# Patient Record
Sex: MALE | NOT HISPANIC OR LATINO
[De-identification: names, ages, dates, MRNs, and addresses within clinical notes are randomized per-mention and may not be internally consistent; named-entity substitution may affect disease eponyms.]

---

## 2017-05-15 ENCOUNTER — RECORDS - HEALTHEAST (OUTPATIENT)
Dept: ADMINISTRATIVE | Facility: OTHER | Age: 22
End: 2017-05-15

## 2017-06-02 ENCOUNTER — HOSPITAL ENCOUNTER (OUTPATIENT)
Dept: CARDIOLOGY | Facility: CLINIC | Age: 22
Discharge: HOME | End: 2017-06-02

## 2017-06-02 DIAGNOSIS — I47.10 SVT (SUPRAVENTRICULAR TACHYCARDIA) (H): ICD-10-CM

## 2022-07-12 ENCOUNTER — OFFICE VISIT (OUTPATIENT)
Dept: FAMILY MEDICINE | Facility: CLINIC | Age: 27
End: 2022-07-12

## 2022-07-12 VITALS
HEART RATE: 72 BPM | BODY MASS INDEX: 27.28 KG/M2 | DIASTOLIC BLOOD PRESSURE: 83 MMHG | OXYGEN SATURATION: 97 % | SYSTOLIC BLOOD PRESSURE: 129 MMHG | TEMPERATURE: 98.1 F | HEIGHT: 77 IN | WEIGHT: 231 LBS

## 2022-07-12 DIAGNOSIS — Z91.89 HISTORY OF WEIGHT CHANGE: ICD-10-CM

## 2022-07-12 DIAGNOSIS — R61 NIGHT SWEATS: ICD-10-CM

## 2022-07-12 DIAGNOSIS — J45.30 MILD PERSISTENT ASTHMA WITHOUT COMPLICATION: ICD-10-CM

## 2022-07-12 DIAGNOSIS — Z87.898 HISTORY OF SUBSTANCE USE: ICD-10-CM

## 2022-07-12 DIAGNOSIS — Z11.3 ROUTINE SCREENING FOR STI (SEXUALLY TRANSMITTED INFECTION): ICD-10-CM

## 2022-07-12 DIAGNOSIS — R00.2 PALPITATIONS: ICD-10-CM

## 2022-07-12 DIAGNOSIS — R82.90 CLOUDY URINE: ICD-10-CM

## 2022-07-12 DIAGNOSIS — Z00.00 HEALTH MAINTENANCE EXAMINATION: Primary | ICD-10-CM

## 2022-07-12 DIAGNOSIS — Z11.1 SCREENING EXAMINATION FOR PULMONARY TUBERCULOSIS: ICD-10-CM

## 2022-07-12 LAB
ALBUMIN UR-MCNC: NEGATIVE MG/DL
APPEARANCE UR: CLEAR
BILIRUB UR QL STRIP: NEGATIVE
COLOR UR AUTO: YELLOW
GLUCOSE UR STRIP-MCNC: NEGATIVE MG/DL
HGB UR QL STRIP: NEGATIVE
KETONES UR STRIP-MCNC: NEGATIVE MG/DL
LEUKOCYTE ESTERASE UR QL STRIP: NEGATIVE
NITRATE UR QL: NEGATIVE
PH UR STRIP: 5.5 [PH] (ref 5–7)
SP GR UR STRIP: >=1.03 (ref 1–1.03)
UROBILINOGEN UR STRIP-ACNC: 0.2 E.U./DL

## 2022-07-12 PROCEDURE — 87591 N.GONORRHOEAE DNA AMP PROB: CPT | Performed by: NURSE PRACTITIONER

## 2022-07-12 PROCEDURE — 87491 CHLMYD TRACH DNA AMP PROBE: CPT | Performed by: NURSE PRACTITIONER

## 2022-07-12 RX ORDER — FLUTICASONE PROPIONATE 44 UG/1
2 AEROSOL, METERED RESPIRATORY (INHALATION) 2 TIMES DAILY
Qty: 10.6 G | Refills: 2 | Status: SHIPPED | OUTPATIENT
Start: 2022-07-12

## 2022-07-12 RX ORDER — ALBUTEROL SULFATE 90 UG/1
2 AEROSOL, METERED RESPIRATORY (INHALATION) EVERY 6 HOURS PRN
Qty: 9.9 G | Refills: 2 | Status: SHIPPED | OUTPATIENT
Start: 2022-07-12

## 2022-07-12 ASSESSMENT — ANXIETY QUESTIONNAIRES
5. BEING SO RESTLESS THAT IT IS HARD TO SIT STILL: SEVERAL DAYS
7. FEELING AFRAID AS IF SOMETHING AWFUL MIGHT HAPPEN: NOT AT ALL
2. NOT BEING ABLE TO STOP OR CONTROL WORRYING: NOT AT ALL
IF YOU CHECKED OFF ANY PROBLEMS ON THIS QUESTIONNAIRE, HOW DIFFICULT HAVE THESE PROBLEMS MADE IT FOR YOU TO DO YOUR WORK, TAKE CARE OF THINGS AT HOME, OR GET ALONG WITH OTHER PEOPLE: SOMEWHAT DIFFICULT
6. BECOMING EASILY ANNOYED OR IRRITABLE: SEVERAL DAYS
GAD7 TOTAL SCORE: 4
GAD7 TOTAL SCORE: 4
1. FEELING NERVOUS, ANXIOUS, OR ON EDGE: SEVERAL DAYS
3. WORRYING TOO MUCH ABOUT DIFFERENT THINGS: NOT AT ALL

## 2022-07-12 ASSESSMENT — PATIENT HEALTH QUESTIONNAIRE - PHQ9
5. POOR APPETITE OR OVEREATING: SEVERAL DAYS
SUM OF ALL RESPONSES TO PHQ QUESTIONS 1-9: 11

## 2022-07-12 NOTE — NURSING NOTE
"ROOM:[address] [place], [place] [address]JUAN    Preferred Name: Inocencio     26 year old  Chief Complaint   Patient presents with     Physical     Medication Request     Sleeping/blood pressure/SVT water retention for consistent sweating          Blood pressure 129/83, pulse 72, temperature 98.1  F (36.7  C), temperature source Oral, height 1.956 m (6' 5\"), weight 104.8 kg (231 lb), SpO2 97 %. Body mass index is 27.39 kg/m .  BP completed using cuff size:    There is no problem list on file for this patient.      Wt Readings from Last 2 Encounters:   07/12/22 104.8 kg (231 lb)     BP Readings from Last 3 Encounters:   07/12/22 129/83       No Known Allergies    No current outpatient medications on file.     No current facility-administered medications for this visit.       Social History     Tobacco Use     Smoking status: Current Every Day Smoker     Types: Cigarettes     Smokeless tobacco: Never Used   Vaping Use     Vaping Use: Never used   Substance Use Topics     Alcohol use: Not Currently     Drug use: Not Currently       Honoring Choices - Health Care Directive Guide offered to patient at time of visit.    There are no preventive care reminders to display for this patient.    Immunization History   Administered Date(s) Administered     DTAP (<7y) 1995, 03/12/1996, 08/13/1996, 10/14/1997, 08/23/2002     HepA-ped 2 Dose 06/27/2008, 08/25/2009     HepB, Unspecified 1995, 03/12/1996, 08/13/1996     Hib, Unspecified 1995, 03/12/1996, 08/13/1996, 10/14/1997     Influenza (IIV3) PF 11/09/2006, 10/18/2007, 10/24/2008     MMR 08/13/1996, 08/23/2002     Meningococcal (Menactra ) 06/27/2008     Polio, Unspecified  1995, 03/12/1996, 08/13/1996, 08/23/2002     Tdap (Adacel,Boostrix) 06/27/2008     Varicella 01/27/2000, 06/27/2008       No results found for: PAP    No lab results found.    PHQ-2 ( 1999 Pfizer) 7/12/2022   Q1: Little interest or pleasure in doing things 2   Q2: Feeling down, depressed or hopeless 2 "   PHQ-2 Score 4       PHQ-9 SCORE 7/12/2022   PHQ-9 Total Score 11       RENATO-7 SCORE 7/12/2022   Total Score 4       No flowsheet data found.    Shahzad aRmos    July 12, 2022 2:11 PM

## 2022-07-12 NOTE — LETTER
July 14, 2022      Inocencio Winkler  [address] [address]         Dear ,    We are writing to inform you of your test results.    Your test results fall within the expected range(s) or remain unchanged from previous results.  Please continue with current treatment plan.    Resulted Orders   NEISSERIA GONORRHOEA PCR   Result Value Ref Range    Neisseria gonorrhoeae Negative Negative      Comment:      Negative for N. gonorrhoeae rRNA by transcription mediated amplification. A negative result by transcription mediated amplification does not preclude the presence of C. trachomatis infection because results are dependent on proper and adequate collection, absence of inhibitors and sufficient rRNA to be detected.   CHLAMYDIA TRACHOMATIS PCR   Result Value Ref Range    Chlamydia trachomatis Negative Negative      Comment:      A negative result by transcription mediated amplification does not preclude the presence of C. trachomatis infection because results are dependent on proper and adequate collection, absence of inhibitors and sufficient rRNA to be detected.   UA without Microscopic   Result Value Ref Range    Color Urine Yellow Colorless, Straw, Light Yellow, Yellow    Appearance Urine Clear Clear    Glucose Urine Negative Negative mg/dL    Bilirubin Urine Negative Negative    Ketones Urine Negative Negative mg/dL    Specific Gravity Urine >=1.030 1.003 - 1.035    Blood Urine Negative Negative    pH Urine 5.5 5.0 - 7.0    Protein Albumin Urine Negative Negative mg/dL    Urobilinogen Urine 0.2 0.2, 1.0 E.U./dL    Nitrite Urine Negative Negative    Leukocyte Esterase Urine Negative Negative     Chlamydia and gonorrhea are negative. Urinalysis does not show abnormalities. Thank you.   If you have any questions or concerns, please call the clinic at the number listed above.       Sincerely,      RAHEEL Beck CNP

## 2022-07-12 NOTE — PROGRESS NOTES
Patient: Amilcar Winkler YOB: 1995  Date of Exam: July/12/2022   Arrival Time: 01 46 PM  Departure Time: Unknown  Allergies: No Known Allergies    Patient Concerns:   Chief Complaint   Patient presents with     Physical     Medication Request     Sleeping/blood pressure/SVT water retention for consistent sweating      26 year-old male presents to the clinic for PE. AT [facility] 1 week for fentanyl use.  Taking Methadodone at [facility] 110mg. Thinks this is not enough as was taking this dose and using at the same time. Is sweaty and feels edgy.  Fentanyl use x4 y  Other concerns:  1.  Sweating currently but ongoing for past 1 year. Drinks   2L  Fluid/d. Also notes 50 pound weight gain and has been told is preDm  2. HistorySVT with failed ablation 2013. Since then has had periodic palpitations. Denies dizziness or fainting  3. Asthma: was on ICS and albuterol.  Not currently on anything. Has trouble taking stairs, gets wheezy going up stairs, worse at night. Occasional cough at night. Cannot play basketball. Triggers: winter, season change. Albuterol did not worsen palpitations by history and only used a few times per year.   4. Tobacco: 0.5-1ppd x 7 years.    Immunizations:   Most Recent Immunizations   Administered Date(s) Administered     DTAP (<7y) 08/23/2002     HepA-ped 2 Dose 08/25/2009     HepB, Unspecified 08/13/1996     Hib, Unspecified 10/14/1997     Influenza (IIV3) PF 10/24/2008     MMR 08/23/2002     Meningococcal (Menactra ) 06/27/2008     Polio, Unspecified  08/23/2002     Tdap (Adacel,Boostrix) 06/27/2008     Varicella 06/27/2008       Do you need any refills on your Medications today? Yes: asthma meds. Thinks needs higher dose methadone    Free of communicable diseases? No observable infectious diseass    Health Maintenance:   Dental 2019  Eye:none    Past Medical History Reviewed? Yes.  Past Medical History:   Diagnosis Date     ADD (attention deficit disorder)      Anxiety       Substance abuse (H)      SVT (supraventricular tachycardia) (H) 2013    failed oblation     Uncomplicated asthma        Past Surgical History:   Procedure Laterality Date     OTHER SURGICAL HISTORY Left     Scope L knee, l hand tendon repair, oblation failed 2013       Family History   Problem Relation Age of Onset     Substance Abuse Mother      Cancer Mother         skin, breast     Substance Abuse Father      Asthma Sister      Mitochondrial disorder Sister      Substance Abuse Maternal Grandmother      Diabetes Maternal Grandmother      Cancer Maternal Grandmother      Heart Failure Maternal Grandfather      Substance Abuse Paternal Grandmother      Cancer Paternal Grandmother         pancreatic     Substance Abuse Paternal Grandfather      Heart Failure Paternal Grandfather      Diabetes Paternal Aunt        Social History     Tobacco Use     Smoking status: Current Every Day Smoker     Types: Cigarettes     Smokeless tobacco: Never Used     Tobacco comment: 0.5-1ppd x 7y   Substance Use Topics     Alcohol use: Not Currently       ROS  GEN: negative for fever, fatigue, weight gain as above  HEENT: negative for vision changes, does not see as well far away as once did but not concern, denies eye irritation, ear pain, nasal congestion, rhinorrhea, sore throat or teeth pain  NECK: negative for pain stiffness  RESP: negative forcough, wheeze. Dyspnea with exercise  CV: negative for chest pain, peripheral edema. Gets palpitations 4-5 times per week. Not sustained, no dizziness. No SOB  GI: negative for nausea or vomiting, abdominal pain, heartburn, stool pattern change, constipation or diarrhea  : negative for dysuria, urgency, frequency.   MSK: ongoing left knee instability. Wears a brace. Feels like it will give out at times. Does not play basketball anymore which was where initial injury occurred. Had knee arthroscopy in youth.   NEURO: negative for headache, dizziness, numbness, tingling or weakness  ENDO:  "negative for increased thirst or urination or temperature intolerance  HEME: negative for bruising or bleeding  DERM: negative for rash or lesions  MOOD: milld depressed mood and has anxiety    General Physical Exam:  Vitals: /83   Pulse 72   Temp 98.1  F (36.7  C) (Oral)   Ht 1.956 m (6' 5\")   Wt 104.8 kg (231 lb)   SpO2 97%   BMI 27.39 kg/m      GEN: alert, in no acute distress  HEENT: Eyes clear, SHELLEY, EOM intact. Discs crisp. Ears: TMs gray with LR, canals intact. Nose: mild edema with scant nasal discharge, OP clear; dentition intact, some plaque. Tonsils without erythema or edema.   NECK: supple. Thyroid smooth and not enlarged.   LYMPH: negative, nontender  RESP: :Lungs clear to auscultation with air entry throughout  CV: HRRR, S1, S2 no murmur, rub or gallop. Pedal pulses +2 bilaterally without edema. 12 Lead EKG: NSR  ABD: Soft with BSx4. No HSM,  nontender. No masses  : Penis without lesions; circumcised. Testicles smooth and descended.  Negative inguinal hernia bilaterally.   MSK: Full ROM spine, extremities. Crepitus left knee palpable.   NEURO: Cranial nerves 2-12 intact.    MOOD: appropriate, intact  DERM: no lesions or rashes. Skin turgor smooth and elastic.           Diagnoses  1. Health maintenance examination  Due for Tdap. Dental referral for routine care. Routine labs.   - TDAP VACCINE (Adacel, Boostrix)  [[id number]]  - Dental Referral  - Lipid panel reflex to direct LDL Fasting; Future  - CBC with platelets; Future    2. History of substance use  Hepatitis screen, liver, kidney screen.  - Hepatitis Antibody A IgG; Future  - Hepatitis C antibody; Future  - Hepatitis B surface antigen; Future  - Hepatitis B Surface Antibody; Future  - Comprehensive metabolic panel; Future    3. Routine screening for STI (sexually transmitted infection)  Asymptomatic  - NEISSERIA GONORRHOEA PCR; Future  - CHLAMYDIA TRACHOMATIS PCR; Future  - NEISSERIA GONORRHOEA PCR  - CHLAMYDIA TRACHOMATIS PCR  - HIV " Antigen Antibody Combo; Future  - Treponema Abs w Reflex to RPR and Titer; Future    4. Screening examination for pulmonary tuberculosis  Asymptomatic  - Quantiferon TB Gold Plus; Future    5. Night sweats  Uncertain etiology: possibly related to Fentanyl withdrawal but no other symptoms. Informed I cannot change dose of Methadone. Seek care if worsening symptoms. Check thyroid today.    6. History of weight change  Weight gain, reviewed healthy living. Reports being told is preDM. Check labs today:  - Hemoglobin A1c; Future  - TSH with free T4 reflex; Future    7. Cloudy urine  Negative for dysuria, denies penile discharge, no fever. UA  - UA without Microscopic; Future  - UA without Microscopic    8. Mild persistent asthma without complication  Discussed management, Rule of 2s, when to return. Avoid overuseof albuterol. [place] for palpitations  - albuterol (PROAIR HFA/PROVENTIL HFA/VENTOLIN HFA) 108 (90 Base) MCG/ACT inhaler; Inhale 2 puffs into the lungs every 6 hours as needed for shortness of breath / dyspnea or wheezing  Dispense: 9.9 g; Refill: 2  - fluticasone (FLOVENT HFA) 44 MCG/ACT inhaler; Inhale 2 puffs into the lungs 2 times daily  Dispense: 10.6 g; Refill: 2    9. Palpitations  History SVT, failed ablation. Recurrent symptoms. EKG: NSR  Refer to cardiology  - EKG 12-lead complete w/read - Clinics  - Adult Cardiology al Critical access hospital Referral; Future  - HC ZIO PATCH 48 HOURS APPLICATION  Lab work pending.     Discussed mood also. Recommend establishing with counseling at [facility]. Return if worsening mood.    Recommended Diet and Special Instructions: No  Limitations or restrictions for activities: No  Information Pertinent to treatment in case of emergency history of SVT, palpitations. Has asthma.     Medication Changes:   MEDICATIONS:   Orders Placed This Encounter   Medications     albuterol (PROAIR HFA/PROVENTIL HFA/VENTOLIN HFA) 108 (90 Base) MCG/ACT inhaler     Sig: Inhale 2 puffs into the lungs  every 6 hours as needed for shortness of breath / dyspnea or wheezing     Dispense:  9.9 g     Refill:  2     Pharmacy may dispense brand covered by insurance (Proair, or proventil or ventolin or generic albuterol inhaler)     fluticasone (FLOVENT HFA) 44 MCG/ACT inhaler     Sig: Inhale 2 puffs into the lungs 2 times daily     Dispense:  10.6 g     Refill:  2     Pharmacy may dispense brand if preferred by insurance.     There are no discontinued medications.       - Continue other medications without change    Referrals   Referral to Cardiology - If you have not heard from the scheduling office within 2 business days, please call [phone] and Referral to Dental - Please call [phone] to schedule your appointment    Follow up plan   Follow up for lab results and after cardiology visit.       RAHEEL Beck CNP

## 2022-07-12 NOTE — PATIENT INSTRUCTIONS
Action Requested: Summary for Provider     []  Critical Lab, Recommendations Needed  [] Need Additional Advice  []   FYI    []   Need Orders  [] Need Medications Sent to Pharmacy  []  Other     SUMMARY: report having shortness of breath, runny nose, fatigu Health maintenance  Routine labs: CBC, CMP, Quant (TB), hepatitis, STI screening, Lipids  Dental referral  Tdap  Schedule COVID vaccine in 2 weeks    Weight changes, night sweats  Check TSH with reflect T4 today    History SVT, palpitations  Referred cardiology  EKG today Normal Sinus Rhythm  Zio patch    Asthma  Albuterol inhaler  Flovent 2 puffs twice daily  Seek care if worsening symptom: unable to tolerate activity, Waking with cough at night more than 2x per week, using albuterol inhaler more than twice per week  Consider tobacco cessation when ready.     Follow up in 2 weeks

## 2022-07-13 LAB
C TRACH DNA SPEC QL NAA+PROBE: NEGATIVE
N GONORRHOEA DNA SPEC QL NAA+PROBE: NEGATIVE

## 2022-07-19 DIAGNOSIS — R00.2 PALPITATIONS: Primary | ICD-10-CM

## 2022-07-22 ENCOUNTER — OFFICE VISIT (OUTPATIENT)
Dept: FAMILY MEDICINE | Facility: CLINIC | Age: 27
End: 2022-07-22

## 2022-07-22 VITALS
WEIGHT: 231 LBS | SYSTOLIC BLOOD PRESSURE: 127 MMHG | HEIGHT: 77 IN | OXYGEN SATURATION: 97 % | DIASTOLIC BLOOD PRESSURE: 79 MMHG | HEART RATE: 70 BPM | BODY MASS INDEX: 27.28 KG/M2 | TEMPERATURE: 98.7 F

## 2022-07-22 DIAGNOSIS — R00.2 PALPITATIONS: Primary | ICD-10-CM

## 2022-07-22 DIAGNOSIS — R33.9 URINARY RETENTION: ICD-10-CM

## 2022-07-22 NOTE — PATIENT INSTRUCTIONS
Rosette Nunez, RAHEEL CNP   [address] [place] [address]   Phone: [phone]   Fax: [phone]    Diagnoses: Palpitations   Order: Adult Cardiology [facility] Referral       Comment: Please be aware that coverage of these services is subject to the terms and limitations of your health insurance plan.  Call member services at your health plan with any benefit or coverage questions.   [facility] will call you to coordinate your care as prescribed by your provider. If you don't hear from a representative within 2 business days, please call [phone].

## 2022-07-22 NOTE — PROGRESS NOTES
"Patient: Amilcar Winkler YOB: 1995    Date of Exam: July/22/2022    Please be advised that this client resides in a facility in which narcotic medications are not permitted. If pain management is needed, please prescribe an alternative medication.     Amilcar Winkler is a 26 year old who presents for the following    Patient presents with:  check up referral   referral to urologist :     Did not get the referral for cardiology  -NABOR Solorzanoen did not have the appt info to schedule    Urinary hesitancy/retention  -Difficulty providing UAs since he arrived at treatment 2.5 weeks ago  -No fentanyl use for the past 2.5 weeks since arriving at treatment (previously used heavily for 3-4 years)  -Been on methadone for 3-4 months  -Takes 120 mg of methadone a day  -Remembers having urinary hesitancy for the past several months, not sure how long  -Has to drink 2 L to provide urine sample  -Sometimes his urine is really hard  -No dysuria, no penile discharge, no testicular pain  -Difficulty achieving and maintaining erection  -Difficulty with ejaculation    Do you need any refills on your Medications today? No    Review Of Systems  Review Of Systems  Skin: negative  Eyes: negative  Ears/Nose/Throat: negative  Respiratory: No shortness of breath, dyspnea on exertion, cough, or hemoptysis  Cardiovascular: negative  Gastrointestinal: negative  Genitourinary: See HPI  Musculoskeletal: negative  Neurologic: negative  Psychiatric: negative  Hematologic/Lymphatic/Immunologic: negative  Endocrine: positive for sweating    General Physical Exam:  Vitals: /79   Pulse 70   Temp 98.7  F (37.1  C) (Oral)   Ht 1.956 m (6' 5\")   Wt 104.8 kg (231 lb)   SpO2 97%   BMI 27.39 kg/m    Physical Exam  Vitals and nursing note reviewed.   Constitutional:       General: He is not in acute distress.     Appearance: Normal appearance. He is not ill-appearing.   HENT:      Head: Normocephalic and atraumatic.   Eyes:      " General: Lids are normal.      Conjunctiva/sclera: Conjunctivae normal.   Pulmonary:      Effort: Pulmonary effort is normal.   Skin:     Comments: Skin intact with no visible lesions.   Neurological:      General: No focal deficit present.      Mental Status: He is alert and oriented to person, place, and time.      Gait: Gait is intact.   Psychiatric:         Mood and Affect: Mood normal.         Behavior: Behavior normal.         Thought Content: Thought content normal.         Judgment: Judgment normal.       If prescribed a controlled substance, may client take medication home when discharged from [facility]? NA     Additional Comments:N/A    Assessment / Plan:  Amilcar was seen today for check up referral  and referral to urologist .    Diagnoses and all orders for this visit:    Palpitations  -     Adult Leadless EKG Monitor 3 to 7 Days; Future  Provided referral info on his AVS from his previous visit with Rosette Nunez NP.    Urinary retention  -Discussed this is likely a side effect from his methadone. Recommended pt report these symptoms to his methadone clinic and discuss potential dosage change. Discussed a urology referral is not needed at this time and this is a known side effect of methadone. Pt should RTC if he experiences pain with urination or is unable to urinate for >6 hours. If pain is severe, pt should go to the ED.    Patient also explained he keeps testing positive on his urine drug screens for fentanyl and he is worried about being discharged from treatment. Discussed it is NOT unusual for a patient who has consistently used fentanyl for 2-3 years to continue to screen positive for low quantities on his urine drug screens for 2-4 weeks. Fentanyl is a  fat soluble drug and can take time to fully get out of his system. I have a very low suspicion of active fentanyl use as long as his drug quantities continue to decrease.      Referrals Made:   NO REFERRALS MADE TODAY  If a referral was made to  a [facility] and you don't get a call from central scheduling please call [phone].  If a Mammogram was ordered for you at [facility] call [phone] to schedule or change your appointment.  If you had an XRay/CT/Ultrasound/MRI ordered the number is [phone] to schedule or change your radiology appointment.     Follow up as needed.    Medication changes made at today's visit: MEDICATIONS:        - Continue other medications without change    Chiquita Barron NP July 22, 2022

## 2022-07-22 NOTE — NURSING NOTE
"ROOM:1  [facility]    Preferred Name: Inocencio     26 year old  Chief Complaint   Patient presents with     check up referral      referral to urologist      Water pills or something to help with sweating or keeping hydration        Blood pressure 127/79, pulse 70, temperature 98.7  F (37.1  C), temperature source Oral, height 1.956 m (6' 5\"), weight 104.8 kg (231 lb), SpO2 97 %. Body mass index is 27.39 kg/m .  BP completed using cuff size:    Patient Active Problem List   Diagnosis     Night sweats       Wt Readings from Last 2 Encounters:   07/22/22 104.8 kg (231 lb)   07/12/22 104.8 kg (231 lb)     BP Readings from Last 3 Encounters:   07/22/22 127/79   07/12/22 129/83       No Known Allergies    Current Outpatient Medications   Medication     albuterol (PROAIR HFA/PROVENTIL HFA/VENTOLIN HFA) 108 (90 Base) MCG/ACT inhaler     fluticasone (FLOVENT HFA) 44 MCG/ACT inhaler     No current facility-administered medications for this visit.       Social History     Tobacco Use     Smoking status: Current Every Day Smoker     Types: Cigarettes     Smokeless tobacco: Never Used     Tobacco comment: 0.5-1ppd x 7y   Vaping Use     Vaping Use: Never used   Substance Use Topics     Alcohol use: Not Currently     Drug use: Not Currently     Types: Fentanyl       Honoring Choices - Health Care Directive Guide offered to patient at time of visit.    Health Maintenance Due   Topic Date Due     NICOTINE/TOBACCO CESSATION COUNSELING Q 1 YR  Never done     ASTHMA ACTION PLAN  Never done     ASTHMA CONTROL TEST  Never done     ADVANCE CARE PLANNING  Never done     COVID-19 Vaccine (1) Never done     Pneumococcal Vaccine: Pediatrics (0 to 5 Years) and At-Risk Patients (6 to 64 Years) (1 - PCV) Never done     HPV IMMUNIZATION (1 - Male 2-dose series) Never done     HIV SCREENING  Never done     HEPATITIS C SCREENING  Never done       Immunization History   Administered Date(s) Administered     DTAP (<7y) 1995, 03/12/1996, " 08/13/1996, 10/14/1997, 08/23/2002     HepA-ped 2 Dose 06/27/2008, 08/25/2009     HepB, Unspecified 1995, 03/12/1996, 08/13/1996     Hib, Unspecified 1995, 03/12/1996, 08/13/1996, 10/14/1997     Influenza (IIV3) PF 11/09/2006, 10/18/2007, 10/24/2008     MMR 08/13/1996, 08/23/2002     Meningococcal (Menactra ) 06/27/2008     Polio, Unspecified  1995, 03/12/1996, 08/13/1996, 08/23/2002     Tdap (Adacel,Boostrix) 06/27/2008, 07/12/2022     Varicella 01/27/2000, 06/27/2008       No results found for: PAP    No lab results found.    PHQ-2 ( 1999 Pfizer) 7/12/2022   Q1: Little interest or pleasure in doing things 2   Q2: Feeling down, depressed or hopeless 2   PHQ-2 Score 4       PHQ-9 SCORE 7/12/2022   PHQ-9 Total Score 11       RENATO-7 SCORE 7/12/2022   Total Score 4       No flowsheet data found.    Shahzad Ramos    July 22, 2022 1:59 PM

## 2022-07-28 ENCOUNTER — LAB (OUTPATIENT)
Dept: LAB | Facility: CLINIC | Age: 27
End: 2022-07-28
Payer: MEDICAID

## 2022-07-28 DIAGNOSIS — Z87.898 HISTORY OF SUBSTANCE USE: ICD-10-CM

## 2022-07-28 DIAGNOSIS — Z11.3 ROUTINE SCREENING FOR STI (SEXUALLY TRANSMITTED INFECTION): ICD-10-CM

## 2022-07-28 DIAGNOSIS — Z00.00 HEALTH MAINTENANCE EXAMINATION: ICD-10-CM

## 2022-07-28 DIAGNOSIS — Z11.1 SCREENING EXAMINATION FOR PULMONARY TUBERCULOSIS: ICD-10-CM

## 2022-07-28 DIAGNOSIS — Z91.89 HISTORY OF WEIGHT CHANGE: ICD-10-CM

## 2022-07-28 LAB
ALBUMIN SERPL-MCNC: 3.9 G/DL (ref 3.4–5)
ALP SERPL-CCNC: 112 U/L (ref 40–150)
ALT SERPL W P-5'-P-CCNC: 32 U/L (ref 0–70)
ANION GAP SERPL CALCULATED.3IONS-SCNC: 5 MMOL/L (ref 3–14)
AST SERPL W P-5'-P-CCNC: 20 U/L (ref 0–45)
BILIRUB SERPL-MCNC: 0.4 MG/DL (ref 0.2–1.3)
BUN SERPL-MCNC: 14 MG/DL (ref 7–30)
CALCIUM SERPL-MCNC: 8.8 MG/DL (ref 8.5–10.1)
CHLORIDE BLD-SCNC: 105 MMOL/L (ref 94–109)
CHOLEST SERPL-MCNC: 127 MG/DL
CO2 SERPL-SCNC: 27 MMOL/L (ref 20–32)
CREAT SERPL-MCNC: 0.68 MG/DL (ref 0.66–1.25)
ERYTHROCYTE [DISTWIDTH] IN BLOOD BY AUTOMATED COUNT: 13.8 % (ref 10–15)
FASTING STATUS PATIENT QL REPORTED: NO
GFR SERPL CREATININE-BSD FRML MDRD: >90 ML/MIN/1.73M2
GLUCOSE BLD-MCNC: 127 MG/DL (ref 70–99)
HBA1C MFR BLD: 5.4 % (ref 0–5.6)
HCT VFR BLD AUTO: 43.1 % (ref 40–53)
HDLC SERPL-MCNC: 36 MG/DL
HGB BLD-MCNC: 14.4 G/DL (ref 13.3–17.7)
LDLC SERPL CALC-MCNC: 51 MG/DL
MCH RBC QN AUTO: 28.6 PG (ref 26.5–33)
MCHC RBC AUTO-ENTMCNC: 33.4 G/DL (ref 31.5–36.5)
MCV RBC AUTO: 86 FL (ref 78–100)
NONHDLC SERPL-MCNC: 91 MG/DL
PLATELET # BLD AUTO: 246 10E3/UL (ref 150–450)
POTASSIUM BLD-SCNC: 3.8 MMOL/L (ref 3.4–5.3)
PROT SERPL-MCNC: 6.9 G/DL (ref 6.8–8.8)
RBC # BLD AUTO: 5.04 10E6/UL (ref 4.4–5.9)
SODIUM SERPL-SCNC: 137 MMOL/L (ref 133–144)
T PALLIDUM AB SER QL: NONREACTIVE
TRIGL SERPL-MCNC: 199 MG/DL
TSH SERPL DL<=0.005 MIU/L-ACNC: 1.65 MU/L (ref 0.4–4)
WBC # BLD AUTO: 12.2 10E3/UL (ref 4–11)

## 2022-07-28 PROCEDURE — 36415 COLL VENOUS BLD VENIPUNCTURE: CPT | Performed by: PATHOLOGY

## 2022-07-28 PROCEDURE — 80053 COMPREHEN METABOLIC PANEL: CPT | Performed by: PATHOLOGY

## 2022-07-28 PROCEDURE — 87340 HEPATITIS B SURFACE AG IA: CPT | Performed by: NURSE PRACTITIONER

## 2022-07-28 PROCEDURE — 84443 ASSAY THYROID STIM HORMONE: CPT | Performed by: PATHOLOGY

## 2022-07-28 PROCEDURE — 86803 HEPATITIS C AB TEST: CPT | Performed by: NURSE PRACTITIONER

## 2022-07-28 PROCEDURE — 80061 LIPID PANEL: CPT | Performed by: PATHOLOGY

## 2022-07-28 PROCEDURE — 86481 TB AG RESPONSE T-CELL SUSP: CPT | Performed by: NURSE PRACTITIONER

## 2022-07-28 PROCEDURE — 86708 HEPATITIS A ANTIBODY: CPT | Performed by: NURSE PRACTITIONER

## 2022-07-28 PROCEDURE — 83036 HEMOGLOBIN GLYCOSYLATED A1C: CPT | Performed by: PATHOLOGY

## 2022-07-28 PROCEDURE — 86706 HEP B SURFACE ANTIBODY: CPT | Performed by: NURSE PRACTITIONER

## 2022-07-28 PROCEDURE — 87389 HIV-1 AG W/HIV-1&-2 AB AG IA: CPT | Performed by: NURSE PRACTITIONER

## 2022-07-28 PROCEDURE — 86780 TREPONEMA PALLIDUM: CPT | Performed by: NURSE PRACTITIONER

## 2022-07-28 PROCEDURE — 85027 COMPLETE CBC AUTOMATED: CPT | Performed by: PATHOLOGY

## 2022-07-28 NOTE — LETTER
August 1, 2022      Inocencio Winkler  [address] [facility] [address]        Dear ,    We are writing to inform you of your test results.    {results letter list:[id number]}    Resulted Orders   Comprehensive metabolic panel   Result Value Ref Range    Sodium 137 133 - 144 mmol/L    Potassium 3.8 3.4 - 5.3 mmol/L    Chloride 105 94 - 109 mmol/L    Carbon Dioxide (CO2) 27 20 - 32 mmol/L    Anion Gap 5 3 - 14 mmol/L    Urea Nitrogen 14 7 - 30 mg/dL    Creatinine 0.68 0.66 - 1.25 mg/dL    Calcium 8.8 8.5 - 10.1 mg/dL    Glucose 127 (H) 70 - 99 mg/dL    Alkaline Phosphatase 112 40 - 150 U/L    AST 20 0 - 45 U/L    ALT 32 0 - 70 U/L    Protein Total 6.9 6.8 - 8.8 g/dL    Albumin 3.9 3.4 - 5.0 g/dL    Bilirubin Total 0.4 0.2 - 1.3 mg/dL    GFR Estimate >90 >60 mL/min/1.73m2      Comment:      Effective December 21, 2021 eGFRcr in adults is calculated using the 2021 CKD-EPI creatinine equation which includes age and gender (Yudi et al., NE, DOI: 10.1056/ZAULyi0419798)   CBC with platelets   Result Value Ref Range    WBC Count 12.2 (H) 4.0 - 11.0 10e3/uL    RBC Count 5.04 4.40 - 5.90 10e6/uL    Hemoglobin 14.4 13.3 - 17.7 g/dL    Hematocrit 43.1 40.0 - 53.0 %    MCV 86 78 - 100 fL    MCH 28.6 26.5 - 33.0 pg    MCHC 33.4 31.5 - 36.5 g/dL    RDW 13.8 10.0 - 15.0 %    Platelet Count 246 150 - 450 10e3/uL   Hepatitis Antibody A IgG   Result Value Ref Range    Hepatitis A Antibody IgG Reactive (A) Nonreactive    Narrative    This assay cannot be used for the diagnosis of acute HAV infection.   Lipid panel reflex to direct LDL Fasting   Result Value Ref Range    Cholesterol 127 <200 mg/dL    Triglycerides 199 (H) <150 mg/dL    Direct Measure HDL 36 (L) >=40 mg/dL    LDL Cholesterol Calculated 51 <=100 mg/dL    Non HDL Cholesterol 91 <130 mg/dL    Patient Fasting > 8hrs? No     Narrative    Cholesterol  Desirable:  <200 mg/dL    Triglycerides  Normal:  Less than 150 mg/dL  Borderline High:  150-199 mg/dL  High:   200-499 mg/dL  Very High:  Greater than or equal to 500 mg/dL    Direct Measure HDL  Female:  Greater than or equal to 50 mg/dL   Male:  Greater than or equal to 40 mg/dL    LDL Cholesterol  Desirable:  <100mg/dL  Above Desirable:  100-129 mg/dL   Borderline High:  130-159 mg/dL   High:  160-189 mg/dL   Very High:  >= 190 mg/dL    Non HDL Cholesterol  Desirable:  130 mg/dL  Above Desirable:  130-159 mg/dL  Borderline High:  160-189 mg/dL  High:  190-219 mg/dL  Very High:  Greater than or equal to 220 mg/dL       If you have any questions or concerns, please call the clinic at the number listed above.       Sincerely,      RAHEEL Beck CNP

## 2022-07-28 NOTE — LETTER
July 29, 2022      Inocencio Winkler  [address] [facility] [address]        Dear ,    We are writing to inform you of your test results.    Test results indicate you may require additional follow up, see comment below.    Resulted Orders   Comprehensive metabolic panel   Result Value Ref Range    Sodium 137 133 - 144 mmol/L    Potassium 3.8 3.4 - 5.3 mmol/L    Chloride 105 94 - 109 mmol/L    Carbon Dioxide (CO2) 27 20 - 32 mmol/L    Anion Gap 5 3 - 14 mmol/L    Urea Nitrogen 14 7 - 30 mg/dL    Creatinine 0.68 0.66 - 1.25 mg/dL    Calcium 8.8 8.5 - 10.1 mg/dL    Glucose 127 (H) 70 - 99 mg/dL    Alkaline Phosphatase 112 40 - 150 U/L    AST 20 0 - 45 U/L    ALT 32 0 - 70 U/L    Protein Total 6.9 6.8 - 8.8 g/dL    Albumin 3.9 3.4 - 5.0 g/dL    Bilirubin Total 0.4 0.2 - 1.3 mg/dL    GFR Estimate >90 >60 mL/min/1.73m2      Comment:      Effective December 21, 2021 eGFRcr in adults is calculated using the 2021 CKD-EPI creatinine equation which includes age and gender (Yudi et al., NEJ, DOI: 10.1056/BZXHlp3570829)   CBC with platelets   Result Value Ref Range    WBC Count 12.2 (H) 4.0 - 11.0 10e3/uL    RBC Count 5.04 4.40 - 5.90 10e6/uL    Hemoglobin 14.4 13.3 - 17.7 g/dL    Hematocrit 43.1 40.0 - 53.0 %    MCV 86 78 - 100 fL    MCH 28.6 26.5 - 33.0 pg    MCHC 33.4 31.5 - 36.5 g/dL    RDW 13.8 10.0 - 15.0 %    Platelet Count 246 150 - 450 10e3/uL   Hepatitis Antibody A IgG   Result Value Ref Range    Hepatitis A Antibody IgG Reactive (A) Nonreactive    Narrative    This assay cannot be used for the diagnosis of acute HAV infection.   Lipid panel reflex to direct LDL Fasting   Result Value Ref Range    Cholesterol 127 <200 mg/dL    Triglycerides 199 (H) <150 mg/dL    Direct Measure HDL 36 (L) >=40 mg/dL    LDL Cholesterol Calculated 51 <=100 mg/dL    Non HDL Cholesterol 91 <130 mg/dL    Patient Fasting > 8hrs? No     Narrative    Cholesterol  Desirable:  <200 mg/dL    Triglycerides  Normal:  Less than 150  mg/dL  Borderline High:  150-199 mg/dL  High:  200-499 mg/dL  Very High:  Greater than or equal to 500 mg/dL    Direct Measure HDL  Female:  Greater than or equal to 50 mg/dL   Male:  Greater than or equal to 40 mg/dL    LDL Cholesterol  Desirable:  <100mg/dL  Above Desirable:  100-129 mg/dL   Borderline High:  130-159 mg/dL   High:  160-189 mg/dL   Very High:  >= 190 mg/dL    Non HDL Cholesterol  Desirable:  130 mg/dL  Above Desirable:  130-159 mg/dL  Borderline High:  160-189 mg/dL  High:  190-219 mg/dL  Very High:  Greater than or equal to 220 mg/dL     Adilson Fitzgerald, here are your lab results. Your cholesterol levels show your triglycerides, a type of cholesterol are high. This is usually due to diet, so eating a low carbohydrate (less processed foods, snack foods, fast foods, breads, cereals, sweets, sweetened beverages) will help.  Your white blood cell count is just a little above normal, which can mean an infection. If you develop fever or chills, you should return.  Your hepatitis B is negative for antibodies, so getting the vaccine is recommended. Liver, kidney and electrolytes are all normal. You are not anemic.  Your blood sugar was above normal but you might not have been fasting. Your 3 month blood sugar was normal. We should recheck another Blood Sugar if you were fasting when these were drawn.  Your screening for sexually transmitted infections is negative. Thank you.   If you have any questions or concerns, please call the clinic at the number listed above.       Sincerely,      RAHEEL Beck CNP

## 2022-07-29 LAB
HAV IGG SER QL IA: REACTIVE
HBV SURFACE AB SERPL IA-ACNC: 0 M[IU]/ML
HBV SURFACE AG SERPL QL IA: NONREACTIVE
HCV AB SERPL QL IA: NONREACTIVE
HIV 1+2 AB+HIV1 P24 AG SERPL QL IA: NONREACTIVE

## 2022-07-31 LAB
GAMMA INTERFERON BACKGROUND BLD IA-ACNC: 0.01 IU/ML
M TB IFN-G BLD-IMP: NEGATIVE
M TB IFN-G CD4+ BCKGRND COR BLD-ACNC: 9.99 IU/ML
MITOGEN IGNF BCKGRD COR BLD-ACNC: 0.01 IU/ML
MITOGEN IGNF BCKGRD COR BLD-ACNC: 0.01 IU/ML
QUANTIFERON MITOGEN: 10 IU/ML
QUANTIFERON NIL TUBE: 0.01 IU/ML
QUANTIFERON TB1 TUBE: 0.02 IU/ML
QUANTIFERON TB2 TUBE: 0.02

## 2022-08-04 ENCOUNTER — OFFICE VISIT (OUTPATIENT)
Dept: CARDIOLOGY | Facility: CLINIC | Age: 27
End: 2022-08-04
Attending: INTERNAL MEDICINE
Payer: MEDICAID

## 2022-08-04 VITALS
SYSTOLIC BLOOD PRESSURE: 131 MMHG | HEIGHT: 76 IN | DIASTOLIC BLOOD PRESSURE: 62 MMHG | HEART RATE: 69 BPM | BODY MASS INDEX: 28.51 KG/M2 | WEIGHT: 234.1 LBS | OXYGEN SATURATION: 97 %

## 2022-08-04 DIAGNOSIS — R00.2 PALPITATIONS: Primary | ICD-10-CM

## 2022-08-04 PROCEDURE — G0463 HOSPITAL OUTPT CLINIC VISIT: HCPCS | Mod: 25

## 2022-08-04 PROCEDURE — 93010 ELECTROCARDIOGRAM REPORT: CPT | Performed by: INTERNAL MEDICINE

## 2022-08-04 PROCEDURE — 93005 ELECTROCARDIOGRAM TRACING: CPT

## 2022-08-04 PROCEDURE — 99203 OFFICE O/P NEW LOW 30 MIN: CPT | Mod: GC | Performed by: INTERNAL MEDICINE

## 2022-08-04 RX ORDER — FLUTICASONE PROPIONATE 110 UG/1
1 AEROSOL, METERED RESPIRATORY (INHALATION) 2 TIMES DAILY
COMMUNITY

## 2022-08-04 ASSESSMENT — PAIN SCALES - GENERAL: PAINLEVEL: NO PAIN (0)

## 2022-08-04 NOTE — NURSING NOTE
Chief Complaint   Patient presents with     New Patient     New Cardiology- New [facility]rez pt, self-referred (?) d/t palpitations     Vitals were taken, medications reconciled, and EKG was performed.    Pako Harding, EMT  3:40 PM

## 2022-08-04 NOTE — PROGRESS NOTES
HPI:   I had Mr Amilcar Winkler is a 26 year old gentleman with history of SVT s/p failed ablation in ~2012, polysubstance use in remission, and tobacco use who presents for evaluation of SVT.     The patient reports a long history of symptomatic SVT. As a child, he had frequent episodes of sudden onset chest pain, difficulty breathing, and accelerated heart rate. He was evaluated by EP and underwent ablation roughly 10 years ago. Follow up holter monitoring after the ablation showed persistent SVT.     The patient was maintained on Flecainide with good control however he stopped this medication when he was incarcerated ~7850-3382. Since that time, he has not seen a cardiologist and has not been taking any medications. He recently became sober and is living in a sober living community. Since becoming sober, he has noticed episodes of SVT. He gets sudden onset sharp chest pains and accelerated heart rates. These last 5-35 minutes and resolve spontaneously. It sometimes helps to perform valsalva maneuvers.     His only current medications are albuterol and an ICS. He uses the albuterol infrequently.     PAST MEDICAL HISTORY:  Past Medical History:   Diagnosis Date     SVT (supraventricular tachycardia) (H)        CURRENT MEDICATIONS:  Current Outpatient Medications   Medication Sig Dispense Refill     albuterol (PROAIR RESPICLICK) 108 (90 Base) MCG/ACT inhaler Inhale 2 puffs into the lungs every 6 hours as needed for shortness of breath / dyspnea or wheezing       fluticasone (FLOVENT HFA) 110 MCG/ACT inhaler Inhale 1 puff into the lungs 2 times daily       amphetamine-dextroamphetamine (ADDERALL) 10 MG tablet Take 10 mg by mouth 2 times daily. (Patient not taking: Reported on 8/4/2022)       ibuprofen (ADVIL,MOTRIN) 600 MG tablet Take 1 tablet by mouth every 6 hours as needed for pain. Take 3-4 times per day for the next few days, then as needed. (Patient not taking: Reported on 8/4/2022) 36 tablet 1     MINOCYCLINE  "HCL  (Patient not taking: Reported on 8/4/2022)       oxycodone (ROXICODONE) 5 MG immediate release tablet Take 1 tablet by mouth every 4 hours as needed (for severe pain). (Patient not taking: Reported on 8/4/2022) 10 tablet 0     PROPRANOLOL HCL  (Patient not taking: Reported on 8/4/2022)         PAST SURGICAL HISTORY:  Past Surgical History:   Procedure Laterality Date     ORTHOPEDIC SURGERY      left knee scope, tendon repair on left hand       ALLERGIES     Allergies   Allergen Reactions     No Known Allergies        FAMILY HISTORY:  No family history on file.    SOCIAL HISTORY:  Social History     Socioeconomic History     Marital status: Single     Spouse name: None     Number of children: None     Years of education: None     Highest education level: None   Tobacco Use     Smoking status: Current Every Day Smoker     Types: Cigarettes     Smokeless tobacco: Never Used     Tobacco comment: ~1 pack daily       ROS:   Constitutional: No fever, chills, or sweats. No weight gain/loss   ENT: No visual disturbance, ear ache, epistaxis, sore throat  Allergies/Immunologic: Negative.   Respiratory: No cough, hemoptysia  Cardiovascular: As per HPI  GI: No nausea, vomiting, hematemesis, melena, or hematochezia  : No urinary frequency, dysuria, or hematuria  Integument: Negative  Psychiatric: Negative  Neuro: Negative  Endocrinology: Negative   Musculoskeletal: Negative    EXAM:  /62 (BP Location: Right arm, Patient Position: Chair, Cuff Size: Adult Large)   Pulse 69   Ht 1.935 m (6' 4.18\")   Wt 106.2 kg (234 lb 1.6 oz)   SpO2 97%   BMI 28.36 kg/m    In general, the patient is a pleasant male in no apparent distress.    HEENT: NC/AT.  EOMI.  Sclerae white, not injected.   Neck: No adenopathy.  No thyromegaly. Carotids +4/4 bilaterally without bruits.  No jugular venous distension.   Heart: RRR. Normal S1, S2 splits physiologically. No murmur, rub, click, or gallop.   Lungs: CTA.  No ronchi, wheezes, rales.  "   Abdomen: Soft, nontender, nondistended. No organomegaly.  No bruits.   Extremities: No clubbing, cyanosis, or edema.  The pulses are +4/4 at the radial and PT sites bilaterally.    Neurologic: Alert and oriented to person/place/time, normal speech, gait and affect  Skin: No petechiae, purpura or rash.    Labs:      Procedures:  [facility]    Assessment and Plan:   Mr Amilcar Winkler is a 26 year old gentleman with history of SVT with prior failed ablation who presents to re-establish cardiology care.     #SVT  History of SVT with prior failed ablation ~10 years prior. No records available in our system and patient is unclear what specific SVT was treated. Experiencing symptoms now that he is sober from drug use. Concern for ACS given sharp chest pains radiating to his arm during these events however the patient produces a reassuring EKG and is otherwise low risk for ischemia.  In terms of his SVT, it is reasonable to restart his cardiology evaluation with a Zio patch and echo as prior tests are not available and it has been >10 years since his last major workup. Pending these results, we will consider EP referral and antiarrhythmic use.   He currently takes albuterol for RAD which he uses 2-3 times per week at most so this is less likely contributing to his arrhythmia.   - Echocardiogram  - Zio Patch, 14 days    Follow up with Dr Armando pending results.       I evaluated and examined patient with CV fellow. I discussed the results with patient and CV fellow and I discussed the diagnostic pan with patient and CV fellow.  I agree with CV fellows' note and I edited the note to a more comprehensive document.    Georges Armando MD, PhD  Professor of Medicine  Division of Cardiology    CC  Patient Care Team:  Jeffrey May MD as PCP - General  SELF, REFERRED

## 2022-08-04 NOTE — LETTER
8/4/2022      RE: Amilcar Winkler  [address] [facility] [address]       Dear Colleague,    Thank you for the opportunity to participate in the care of your patient, Amilcar Winkler, at the [facility] [place] [facility]. Please see a copy of my visit note below.    HPI:   I had Mr Amilcar Winkler is a 26 year old gentleman with history of SVT s/p failed ablation in ~2012, polysubstance use in remission, and tobacco use who presents for evaluation of SVT.     The patient reports a long history of symptomatic SVT. As a child, he had frequent episodes of sudden onset chest pain, difficulty breathing, and accelerated heart rate. He was evaluated by EP and underwent ablation roughly 10 years ago. Follow up holter monitoring after the ablation showed persistent SVT.     The patient was maintained on Flecainide with good control however he stopped this medication when he was incarcerated ~9294-2038. Since that time, he has not seen a cardiologist and has not been taking any medications. He recently became sober and is living in a sober living community. Since becoming sober, he has noticed episodes of SVT. He gets sudden onset sharp chest pains and accelerated heart rates. These last 5-35 minutes and resolve spontaneously. It sometimes helps to perform valsalva maneuvers.     His only current medications are albuterol and an ICS. He uses the albuterol infrequently.     PAST MEDICAL HISTORY:  Past Medical History:   Diagnosis Date     SVT (supraventricular tachycardia) (H)        CURRENT MEDICATIONS:  Current Outpatient Medications   Medication Sig Dispense Refill     albuterol (PROAIR RESPICLICK) 108 (90 Base) MCG/ACT inhaler Inhale 2 puffs into the lungs every 6 hours as needed for shortness of breath / dyspnea or wheezing       fluticasone (FLOVENT HFA) 110 MCG/ACT inhaler Inhale 1 puff into the lungs 2 times daily        "amphetamine-dextroamphetamine (ADDERALL) 10 MG tablet Take 10 mg by mouth 2 times daily. (Patient not taking: Reported on 8/4/2022)       ibuprofen (ADVIL,MOTRIN) 600 MG tablet Take 1 tablet by mouth every 6 hours as needed for pain. Take 3-4 times per day for the next few days, then as needed. (Patient not taking: Reported on 8/4/2022) 36 tablet 1     MINOCYCLINE HCL  (Patient not taking: Reported on 8/4/2022)       oxycodone (ROXICODONE) 5 MG immediate release tablet Take 1 tablet by mouth every 4 hours as needed (for severe pain). (Patient not taking: Reported on 8/4/2022) 10 tablet 0     PROPRANOLOL HCL  (Patient not taking: Reported on 8/4/2022)         PAST SURGICAL HISTORY:  Past Surgical History:   Procedure Laterality Date     ORTHOPEDIC SURGERY      left knee scope, tendon repair on left hand       ALLERGIES     Allergies   Allergen Reactions     No Known Allergies        FAMILY HISTORY:  No family history on file.    SOCIAL HISTORY:  Social History     Socioeconomic History     Marital status: Single     Spouse name: None     Number of children: None     Years of education: None     Highest education level: None   Tobacco Use     Smoking status: Current Every Day Smoker     Types: Cigarettes     Smokeless tobacco: Never Used     Tobacco comment: ~1 pack daily       ROS:   Constitutional: No fever, chills, or sweats. No weight gain/loss   ENT: No visual disturbance, ear ache, epistaxis, sore throat  Allergies/Immunologic: Negative.   Respiratory: No cough, hemoptysia  Cardiovascular: As per HPI  GI: No nausea, vomiting, hematemesis, melena, or hematochezia  : No urinary frequency, dysuria, or hematuria  Integument: Negative  Psychiatric: Negative  Neuro: Negative  Endocrinology: Negative   Musculoskeletal: Negative    EXAM:  /62 (BP Location: Right arm, Patient Position: Chair, Cuff Size: Adult Large)   Pulse 69   Ht 1.935 m (6' 4.18\")   Wt 106.2 kg (234 lb 1.6 oz)   SpO2 97%   BMI 28.36 kg/m  "   In general, the patient is a pleasant male in no apparent distress.    HEENT: NC/AT.  EOMI.  Sclerae white, not injected.   Neck: No adenopathy.  No thyromegaly. Carotids +4/4 bilaterally without bruits.  No jugular venous distension.   Heart: RRR. Normal S1, S2 splits physiologically. No murmur, rub, click, or gallop.   Lungs: CTA.  No ronchi, wheezes, rales.    Abdomen: Soft, nontender, nondistended. No organomegaly.  No bruits.   Extremities: No clubbing, cyanosis, or edema.  The pulses are +4/4 at the radial and PT sites bilaterally.    Neurologic: Alert and oriented to person/place/time, normal speech, gait and affect  Skin: No petechiae, purpura or rash.    Labs:      Procedures:  EKG: sin ryhtm    Assessment and Plan:   Mr Amilcar Winkler is a 26 year old gentleman with history of SVT with prior failed ablation who presents to re-establish cardiology care.     #SVT  History of SVT with prior failed ablation ~10 years prior. No records available in our system and patient is unclear what specific SVT was treated. Experiencing symptoms now that he is sober from drug use. Concern for ACS given sharp chest pains radiating to his arm during these events however the patient produces a reassuring EKG and is otherwise low risk for ischemia.  In terms of his SVT, it is reasonable to restart his cardiology evaluation with a Zio patch and echo as prior tests are not available and it has been >10 years since his last major workup. Pending these results, we will consider EP referral and antiarrhythmic use.   He currently takes albuterol for RAD which he uses 2-3 times per week at most so this is less likely contributing to his arrhythmia.   - Echocardiogram  - Zio Patch, 14 days    Follow up with Dr Armando pending results.       I evaluated and examined patient with CV fellow. I discussed the results with patient and CV fellow and I discussed the diagnostic pan with patient and CV fellow.  I agree with CV fellows' note  and I edited the note to a more comprehensive document.    Georges Armando MD, PhD  Professor of Medicine  Division of Cardiology    CC  Patient Care Team:  Jeffrey May MD as PCP - General  SELF, REFERRED

## 2022-08-08 LAB
ATRIAL RATE - MUSE: 66 BPM
DIASTOLIC BLOOD PRESSURE - MUSE: NORMAL MMHG
INTERPRETATION ECG - MUSE: NORMAL
P AXIS - MUSE: 48 DEGREES
PR INTERVAL - MUSE: 160 MS
QRS DURATION - MUSE: 90 MS
QT - MUSE: 394 MS
QTC - MUSE: 413 MS
R AXIS - MUSE: 31 DEGREES
SYSTOLIC BLOOD PRESSURE - MUSE: NORMAL MMHG
T AXIS - MUSE: 27 DEGREES
VENTRICULAR RATE- MUSE: 66 BPM

## 2022-08-23 ENCOUNTER — TELEPHONE (OUTPATIENT)
Dept: FAMILY MEDICINE | Facility: CLINIC | Age: 27
End: 2022-08-23

## 2022-08-24 ENCOUNTER — ANCILLARY PROCEDURE (OUTPATIENT)
Dept: CARDIOLOGY | Facility: CLINIC | Age: 27
End: 2022-08-24
Attending: INTERNAL MEDICINE
Payer: MEDICAID

## 2022-08-24 DIAGNOSIS — R00.2 PALPITATIONS: ICD-10-CM

## 2022-08-24 LAB — LVEF ECHO: NORMAL

## 2022-08-24 PROCEDURE — 93248 EXT ECG>7D<15D REV&INTERPJ: CPT | Performed by: INTERNAL MEDICINE

## 2022-08-24 PROCEDURE — 93306 TTE W/DOPPLER COMPLETE: CPT | Performed by: INTERNAL MEDICINE

## 2022-08-24 PROCEDURE — 93246 EXT ECG>7D<15D RECORDING: CPT

## 2022-08-24 NOTE — PROGRESS NOTES
Per Dr. Armando, patient to have 14-day Zio monitor placed.  Diagnosis: palpitations   Monitor placed: Yes  Patient Instructed: Yes  Patient verbalized understanding: Yes  Holter # [id number]      Monitor was placed by BERNA Chatterjee   3:02 PM

## 2022-09-18 NOTE — RESULT ENCOUNTER NOTE
Please let patient know that patient had a nonsustained VT - needs to be seen by Farhad Donahue    Patient has subjectively complaints of palpitations.    Thanks    Georges Armando MD, PhD  Professor of Medicine  Division of Cardiology

## 2022-09-19 DIAGNOSIS — I47.20 V-TACH (H): ICD-10-CM

## 2022-09-19 DIAGNOSIS — I47.10 SVT (SUPRAVENTRICULAR TACHYCARDIA) (H): ICD-10-CM

## 2022-09-19 DIAGNOSIS — R00.2 PALPITATIONS: Primary | ICD-10-CM

## 2022-09-20 ENCOUNTER — TELEPHONE (OUTPATIENT)
Dept: CARDIOLOGY | Facility: CLINIC | Age: 27
End: 2022-09-20

## 2022-09-20 NOTE — TELEPHONE ENCOUNTER
----- Message from Hannah Morgan RN sent at 9/19/2022  4:06 PM CDT -----  Hi!    I put in a referral for this pt to see Dr. Donahue. Can you call to help with scheduling? The mom is the starred number in the chart, and she is aware of the referral. Probably best to connect with her as the pt is in treatment.    KUMAR Young  Cardiology Care Coordinator

## 2022-10-12 ENCOUNTER — TELEPHONE (OUTPATIENT)
Dept: CARDIOLOGY | Facility: CLINIC | Age: 27
End: 2022-10-12

## 2023-01-04 ENCOUNTER — APPOINTMENT (OUTPATIENT)
Dept: UROLOGY | Facility: CLINIC | Age: 28
End: 2023-01-04